# Patient Record
Sex: FEMALE | Race: WHITE | NOT HISPANIC OR LATINO | URBAN - METROPOLITAN AREA
[De-identification: names, ages, dates, MRNs, and addresses within clinical notes are randomized per-mention and may not be internally consistent; named-entity substitution may affect disease eponyms.]

---

## 2023-08-26 ENCOUNTER — HOSPITAL ENCOUNTER (EMERGENCY)
Facility: HOSPITAL | Age: 43
Discharge: HOME | End: 2023-08-26
Attending: STUDENT IN AN ORGANIZED HEALTH CARE EDUCATION/TRAINING PROGRAM

## 2023-08-26 VITALS
HEIGHT: 63 IN | HEART RATE: 69 BPM | WEIGHT: 130 LBS | DIASTOLIC BLOOD PRESSURE: 65 MMHG | OXYGEN SATURATION: 100 % | RESPIRATION RATE: 20 BRPM | BODY MASS INDEX: 23.04 KG/M2 | SYSTOLIC BLOOD PRESSURE: 108 MMHG | TEMPERATURE: 98 F

## 2023-08-26 DIAGNOSIS — T40.601A OPIATE OVERDOSE, ACCIDENTAL OR UNINTENTIONAL, INITIAL ENCOUNTER: Primary | ICD-10-CM

## 2023-08-26 LAB — POCT GLUCOSE: 99 MG/DL (ref 70–110)

## 2023-08-26 PROCEDURE — 82962 GLUCOSE BLOOD TEST: CPT

## 2023-08-26 PROCEDURE — 99283 EMERGENCY DEPT VISIT LOW MDM: CPT

## 2023-08-26 RX ORDER — NALOXONE HYDROCHLORIDE 4 MG/.1ML
SPRAY NASAL
Qty: 1 EACH | Refills: 11 | Status: SHIPPED | OUTPATIENT
Start: 2023-08-26

## 2023-08-26 NOTE — DISCHARGE INSTRUCTIONS

## 2023-08-26 NOTE — ED PROVIDER NOTES
Encounter Date: 8/26/2023    SCRIBE #1 NOTE: ITez, am scribing for, and in the presence of,  Peterson Lindsey MD.       History     Chief Complaint   Patient presents with    Drug Overdose     Patient arrives via EMS after patient was found unresponsive in a parking lot. Initially had agonal respirations and pinpoint pupils. Given 1 mg narcan by EMS. Patient is now awake and alert.      Fernanda Brito is a 43 y.o. female who has a past medical history of Fentanyl abuse presents to the ED due to syncope. EMS reports finding patient unresponsive in parking lot with agonal breaths and pinpoint pupils. Patient was given 1 mg Narcan and woke up. Upon arrival, patient admits snorting a high dose of Fentanyl for the first time in 2 years. She expresses that her breathing feels like her normal. Denies chest pain, nausea, vomiting, and abdominal pain.        The history is provided by the patient and the EMS personnel.     Review of patient's allergies indicates:  Not on File  No past medical history on file.  No past surgical history on file.  No family history on file.     Review of Systems   Constitutional:  Negative for chills and fever.   HENT:  Negative for facial swelling and sore throat.    Eyes:  Negative for visual disturbance.   Respiratory:  Negative for cough and shortness of breath.    Cardiovascular:  Negative for chest pain and palpitations.   Gastrointestinal:  Negative for abdominal pain, nausea and vomiting.   Genitourinary:  Negative for dysuria and hematuria.   Musculoskeletal:  Negative for back pain.   Skin:  Negative for rash.   Neurological:  Positive for syncope. Negative for weakness and headaches.   Hematological:  Does not bruise/bleed easily.   Psychiatric/Behavioral: Negative.         Physical Exam     Initial Vitals [08/26/23 0304]   BP Pulse Resp Temp SpO2   120/70 (!) 120 20 98.4 °F (36.9 °C) 100 %      MAP       --         Physical Exam    Nursing note and vitals  reviewed.  Constitutional: She appears well-developed and well-nourished. She is not diaphoretic. No distress.   HENT:   Head: Normocephalic and atraumatic.   Right Ear: External ear normal.   Left Ear: External ear normal.   Nose: Nose normal.   Eyes: Conjunctivae are normal. Pupils are equal, round, and reactive to light. No scleral icterus.   Pupils are 2 mm bilaterally.   Neck: Neck supple. No tracheal deviation present.   Normal range of motion.  Cardiovascular:  Normal rate, regular rhythm and normal heart sounds.           Pulmonary/Chest: Breath sounds normal. No respiratory distress.   Abdominal: Abdomen is soft. Bowel sounds are normal. There is no abdominal tenderness.   Musculoskeletal:      Cervical back: Normal range of motion and neck supple.     Neurological: She is alert and oriented to person, place, and time. GCS score is 15. GCS eye subscore is 4. GCS verbal subscore is 5. GCS motor subscore is 6.   Skin: Skin is warm and dry.   Psychiatric: She has a normal mood and affect. Thought content normal.         ED Course   Procedures  Labs Reviewed   POCT GLUCOSE   POCT GLUCOSE MONITORING CONTINUOUS          Imaging Results    None          Medications - No data to display  Medical Decision Making  Risk  Prescription drug management.               ED Course as of 08/26/23 0729   Sat Aug 26, 2023   0626 On re-evaluation patient GCS 15 awake and alert.  She looks well at bedside.  No complaints at this time. [CC]   0728 Differential diagnosis includes but not limited to heroin overdose, hypoglycemia, fentanyl overdose, head injury. [CC]   0728 Patient presenting with noted fentanyl overdose.  She looks well on re-evaluation.  She did not require repeat Narcan dosing.  No returned opioid toxidrome.  She was observed in the emergency department for 4-1/2 hours.  I believe she is appropriate for discharge.  She was given a Narcan prescription and information for outpatient follow-up regarding opiate  addiction.  She was counseled on safe for opioid use should she continue using.  Strict return precautions given. I discussed with the patient/family the diagnosis, treatment plan, indications for return to the emergency department, and for expected follow-up. The patient/family verbalized an understanding. The patient/family is asked if there are any questions or concerns. We discuss the case, until all issues are addressed to the patient/family's satisfaction. Patient/family understands and is agreeable to the plan. Patient is stable and ready for discharge.      [CC]      ED Course User Index  [CC] Peterson Lindsey MD                    IPeterson MD, personally performed the services described in this documentation. All medical record entries made by the scribe were at my direction and in my presence. I have reviewed the chart and agree that the record reflects my personal performance and is accurate and complete.    Clinical Impression:   Final diagnoses:  [T40.601A] Opiate overdose, accidental or unintentional, initial encounter (Primary)        ED Disposition Condition    Discharge Stable          ED Prescriptions       Medication Sig Dispense Start Date End Date Auth. Provider    naloxone (NARCAN) 4 mg/actuation Spry 4mg by nasal route as needed for opioid overdose; may repeat every 2-3 minutes in alternating nostrils until medical help arrives. Call 911 1 each 8/26/2023 -- Peterson Lindsey MD          Follow-up Information       Follow up With Specialties Details Why Contact [facility] - Emergency Dept Emergency Medicine Go to  If symptoms worsen [place] [address] [phone]    Your PCP  Schedule an appointment as soon as possible for a visit                Peterson Lindsey MD  08/26/23 2897

## 2023-08-26 NOTE — ED NOTES
Pt aa&ox4, steady gait noted, respirations even and unlabored, NAD noted.  Discharge papers given to patient and resources discussed with patient.

## 2023-08-26 NOTE — ED NOTES
Assumed care from BARERTT Wilhelm.     Pt appears to be resting, chest rise and fall noted.  Pt on continuous cm/NiBP/SpO2.  Bed locked in low position with side rails up x2, call light within reach.  Will continue to monitor.

## 2023-08-26 NOTE — ED TRIAGE NOTES
Drug Overdose (Patient arrives via EMS after patient was found unresponsive in a parking lot. Initially had agonal respirations and pinpoint pupils. Given 1 mg narcan by EMS. Patient is now awake and alert. )    Pt reports snorting fentanyl , denies taking alcohol . Pt states she doesn't have any concerns or symptoms at this time , she's requesting to send her home. States she's tired and need some sleep